# Patient Record
Sex: FEMALE | Race: ASIAN | Employment: OTHER | URBAN - METROPOLITAN AREA
[De-identification: names, ages, dates, MRNs, and addresses within clinical notes are randomized per-mention and may not be internally consistent; named-entity substitution may affect disease eponyms.]

---

## 2020-06-03 ENCOUNTER — HOSPITAL ENCOUNTER (EMERGENCY)
Facility: CLINIC | Age: 30
Discharge: HOME | End: 2020-06-03
Attending: EMERGENCY MEDICINE | Admitting: EMERGENCY MEDICINE
Payer: COMMERCIAL

## 2020-06-03 VITALS
OXYGEN SATURATION: 100 % | DIASTOLIC BLOOD PRESSURE: 64 MMHG | HEART RATE: 73 BPM | RESPIRATION RATE: 12 BRPM | TEMPERATURE: 97 F | SYSTOLIC BLOOD PRESSURE: 109 MMHG

## 2020-06-03 DIAGNOSIS — R55 VASOVAGAL SYNCOPE: Primary | ICD-10-CM

## 2020-06-03 LAB
ALBUMIN UR-MCNC: 10 MG/DL
ANION GAP SERPL CALCULATED.3IONS-SCNC: 6 MMOL/L (ref 3–14)
APPEARANCE UR: CLEAR
BACTERIA #/AREA URNS HPF: ABNORMAL /HPF
BASOPHILS # BLD AUTO: 0.1 10E9/L (ref 0–0.2)
BASOPHILS NFR BLD AUTO: 1.8 %
BILIRUB UR QL STRIP: NEGATIVE
BUN SERPL-MCNC: 10 MG/DL (ref 7–30)
CALCIUM SERPL-MCNC: 8.5 MG/DL (ref 8.5–10.1)
CHLORIDE SERPL-SCNC: 108 MMOL/L (ref 94–109)
CO2 SERPL-SCNC: 25 MMOL/L (ref 20–32)
COLOR UR AUTO: YELLOW
CREAT SERPL-MCNC: 0.65 MG/DL (ref 0.52–1.04)
DIFFERENTIAL METHOD BLD: NORMAL
EOSINOPHIL # BLD AUTO: 0.6 10E9/L (ref 0–0.7)
EOSINOPHIL NFR BLD AUTO: 7.7 %
ERYTHROCYTE [DISTWIDTH] IN BLOOD BY AUTOMATED COUNT: 11.9 % (ref 10–15)
GFR SERPL CREATININE-BSD FRML MDRD: >90 ML/MIN/{1.73_M2}
GLUCOSE SERPL-MCNC: 98 MG/DL (ref 70–99)
GLUCOSE UR STRIP-MCNC: NEGATIVE MG/DL
HCG SERPL QL: NEGATIVE
HCT VFR BLD AUTO: 40.7 % (ref 35–47)
HGB BLD-MCNC: 13.9 G/DL (ref 11.7–15.7)
HGB UR QL STRIP: NEGATIVE
HYALINE CASTS #/AREA URNS LPF: 1 /LPF (ref 0–2)
IMM GRANULOCYTES # BLD: 0 10E9/L (ref 0–0.4)
IMM GRANULOCYTES NFR BLD: 0.3 %
KETONES UR STRIP-MCNC: NEGATIVE MG/DL
LEUKOCYTE ESTERASE UR QL STRIP: NEGATIVE
LYMPHOCYTES # BLD AUTO: 1.8 10E9/L (ref 0.8–5.3)
LYMPHOCYTES NFR BLD AUTO: 23.8 %
MCH RBC QN AUTO: 30.4 PG (ref 26.5–33)
MCHC RBC AUTO-ENTMCNC: 34.2 G/DL (ref 31.5–36.5)
MCV RBC AUTO: 89 FL (ref 78–100)
MONOCYTES # BLD AUTO: 0.6 10E9/L (ref 0–1.3)
MONOCYTES NFR BLD AUTO: 7.4 %
MUCOUS THREADS #/AREA URNS LPF: PRESENT /LPF
NEUTROPHILS # BLD AUTO: 4.4 10E9/L (ref 1.6–8.3)
NEUTROPHILS NFR BLD AUTO: 59 %
NITRATE UR QL: NEGATIVE
NRBC # BLD AUTO: 0 10*3/UL
NRBC BLD AUTO-RTO: 0 /100
PH UR STRIP: 6 PH (ref 5–7)
PLATELET # BLD AUTO: 242 10E9/L (ref 150–450)
POTASSIUM SERPL-SCNC: 3.4 MMOL/L (ref 3.4–5.3)
RBC # BLD AUTO: 4.57 10E12/L (ref 3.8–5.2)
RBC #/AREA URNS AUTO: 1 /HPF (ref 0–2)
SODIUM SERPL-SCNC: 139 MMOL/L (ref 133–144)
SOURCE: ABNORMAL
SP GR UR STRIP: 1.02 (ref 1–1.03)
SQUAMOUS #/AREA URNS AUTO: 5 /HPF (ref 0–1)
UROBILINOGEN UR STRIP-MCNC: NORMAL MG/DL (ref 0–2)
WBC # BLD AUTO: 7.4 10E9/L (ref 4–11)
WBC #/AREA URNS AUTO: 2 /HPF (ref 0–5)

## 2020-06-03 PROCEDURE — 96361 HYDRATE IV INFUSION ADD-ON: CPT

## 2020-06-03 PROCEDURE — 84703 CHORIONIC GONADOTROPIN ASSAY: CPT | Performed by: EMERGENCY MEDICINE

## 2020-06-03 PROCEDURE — 25800030 ZZH RX IP 258 OP 636: Performed by: EMERGENCY MEDICINE

## 2020-06-03 PROCEDURE — 99284 EMERGENCY DEPT VISIT MOD MDM: CPT | Mod: 25

## 2020-06-03 PROCEDURE — 80048 BASIC METABOLIC PNL TOTAL CA: CPT | Performed by: EMERGENCY MEDICINE

## 2020-06-03 PROCEDURE — 81001 URINALYSIS AUTO W/SCOPE: CPT | Performed by: EMERGENCY MEDICINE

## 2020-06-03 PROCEDURE — 93005 ELECTROCARDIOGRAM TRACING: CPT

## 2020-06-03 PROCEDURE — 96360 HYDRATION IV INFUSION INIT: CPT

## 2020-06-03 PROCEDURE — 85025 COMPLETE CBC W/AUTO DIFF WBC: CPT | Performed by: EMERGENCY MEDICINE

## 2020-06-03 RX ORDER — SODIUM CHLORIDE 9 MG/ML
1000 INJECTION, SOLUTION INTRAVENOUS CONTINUOUS
Status: DISCONTINUED | OUTPATIENT
Start: 2020-06-03 | End: 2020-06-03 | Stop reason: HOSPADM

## 2020-06-03 RX ADMIN — SODIUM CHLORIDE 1000 ML: 9 INJECTION, SOLUTION INTRAVENOUS at 14:56

## 2020-06-03 ASSESSMENT — ENCOUNTER SYMPTOMS
VOMITING: 0
FEVER: 0
BACK PAIN: 0
ABDOMINAL PAIN: 0
SHORTNESS OF BREATH: 0
DIARRHEA: 0

## 2020-06-03 NOTE — ED PROVIDER NOTES
History     Chief Complaint:  Loss of Consciousness    The history is provided by the patient.      Sydni Smith is a 30 year old female who presents with a syncope episode while sitting down at the hair salon just prior to arrival. The hair salon called EMS, prompting her arrival to the ED. Patient reports various syncopal episodes in the past. They have been due to a variety of reasons, including dehydration, electrolyte abnormalities, and iron deficiency.     Here, the patient states she is back to feeling normal. She denies chest or abdominal pain, shortness of breath, diarrhea, vomiting, back pain, or fevers. Patient denies a family history of sudden cardiac deaths. To note, patient has had a history of anemia but states it has only been during pregnancies and denies being pregnant now.     Last menstrual period: 2020    Allergies:  No Known Drug Allergies    Medications:    The patient is not currently taking any prescribed medications.     Past Medical History:    The patient does not have any pertinent past medical history.    Past Surgical History:     Section    Family History:    No past pertinent family history.    Social History:  The patient was accompanied to the ED via EMS.  Smoking Status: Never Smoker  Smokeless Tobacco: Never Used  Alcohol Use: Positive  Drug Use: Negative    Review of Systems   Constitutional: Negative for fever.   Respiratory: Negative for shortness of breath.    Cardiovascular: Negative for chest pain.   Gastrointestinal: Negative for abdominal pain, diarrhea and vomiting.   Musculoskeletal: Negative for back pain.   Neurological: Positive for syncope.   All other systems reviewed and are negative.    Physical Exam     Patient Vitals for the past 24 hrs:   BP Temp Temp src Pulse Heart Rate Resp SpO2   20 1630 109/64 -- -- 73 70 12 100 %   20 1615 105/71 -- -- 71 75 20 100 %   20 1515 104/63 -- -- 65 63 15 100 %   20 1500 101/74 -- -- 70  62 21 98 %   06/03/20 1426 100/59 97  F (36.1  C) Oral 59 -- 18 100 %       1549 The patient's orthostatic vitals were monitored here in the emergency department and found to be 107/65 while laying with a heart rate of 68 bpm, 110/71 while sitting with a heart rate of 72 bpm, and 114/73 while standing with a heart rate of 98 bpm.    Physical Exam  General: Alert, appears well-developed and well-nourished. Cooperative.     In no acute distress  HEENT:  Head:  Atraumatic  Ears:  External ears are normal  Mouth/Throat:  Oropharynx is without erythema or exudate and mucous membranes are moist.   Eyes:   Conjunctivae normal and EOM are normal. No scleral icterus.    Pupils are equal, round, and reactive to light.   CV:  Normal rate, regular rhythm, normal heart sounds and radial pulses are 2+ and symmetric.  No murmur.  Resp:  Breath sounds are clear bilaterally    Non-labored, no retractions or accessory muscle use  GI:  Abdomen is soft, no distension, no tenderness. No rebound or guarding.  No CVA tenderness bilaterally  MS:  Normal range of motion. No edema.    Normal strength in all 4 extremities.     Back atraumatic.    No midline cervical, thoracic, or lumbar tenderness  Skin:  Warm and dry.  No rash or lesions noted.  Neuro: Alert. Normal strength.  GCS: 15  Psych:  Normal mood and affect.    Emergency Department Course     ECG:  Indication: Loss of consciousness  Time: 1430  Vent. Rate 59 bpm. UT interval 146. QRS duration 92. QT/QTc 484/479. P-R-T axis 44 77 66. Sinus bradycardia. Normal ECG. Read time: 1432.    Laboratory:  Laboratory findings were communicated with the patient who voiced understanding of the findings.    BMP: Glucose 98 o/w WNL (Creatinine: 0.65)    CBC: WBC: 7.4, HGB: 13.9, PLT: 242    HCG Qualitative: Negative    UA with Microscopic: Protein Albumin: 10 (A), Bacteria: Few, Squamous Epithelial: 5 (H), Mucous: Present o/w Negative    Interventions:  1456 NS 1L IV    Emergency Department  Course:  Past medical records, nursing notes, and vitals reviewed.    1425 I performed an exam of the patient as documented above.     EKG obtained in the ED, see results above.     IV was inserted and blood was drawn for laboratory testing, results above.    The patient provided a urine sample here in the emergency department. This was sent for laboratory testing, findings above.     1617 I rechecked the patient and discussed the results of her workup thus far.     Findings and plan explained to the Patient. Patient discharged home with instructions regarding supportive care, medications, and reasons to return. The importance of close follow-up was reviewed.    I personally reviewed the laboratory results with the Patient and answered all related questions prior to discharge.     Impression & Plan     Medical Decision Making:  Sydni Smith is a 30 year old female who presented to the ED today for evaluation of possible syncope.  Details of the patient's history can be noted in the HPI.  Differential diagnosis included cardiac arrhythmia, ACS, aortic stenosis, hypertrophic cardiomyopathy, PE, orthostatic hypotension, drug use/reaction, seizure, TIA, stroke, vasovagal, electrolyte abnormalities, amongst others.  Upon my exam, patient was well-appearing and neurovascularly intact.  No murmur heard.  Basic laboratory analysis was completed, results without significant abnormalities.  ECG showed no signs of arrhythmia.  Interventions here included IV fluids.  Orthostatics were completed, and were within normal limits. No other signs of chest pain, shortness of breath, no seizure-like activity or postictal period.  No family history of sudden death.  No focal neurologic symptoms and no complaints of concerning headache here today.  With reassuring workup and physical exam here in the ED, I felt comfortable with the patient's discharge and outpatient management.  Symptoms most likely consistent with vasovagal syncope.   Symptomatic care as discussed and the patient will follow up with her primary care provider within the next few days for recheck.  They will return to the ED for any changing worsening symptoms, chest pain, shortness of breath, severe headache, fever, additional syncope, new concerns.  All questions were answered prior to discharge.  The patient is in agreement with the treatment plan as stated above.    Diagnosis:    ICD-10-CM    1. Vasovagal syncope  R55 UA with Microscopic       Disposition:  Discharged to home.    Scribe Disclosure:  IMichel, am serving as a scribe at 2:57 PM on 6/3/2020 to document services personally performed by Elie Hess MD, based on my observations and the provider's statements to me.     I, Meliza Herrera, am serving as a scribe on 6/3/2020 at 5:02 PM to personally document services performed by Elie Hess MD, based on my observations and the provider's statements to me.        Elie Hess MD  06/03/20 0477

## 2020-06-03 NOTE — ED AVS SNAPSHOT
[facility] Emergency Department  [address]  [facility] [address]  Phone:  [phone]  Fax:  [phone]                                    Sydni Smith   MRN: [id number]    Department:  [facility] Emergency Department   Date of Visit:  6/3/2020           After Visit Summary Signature Page    I have received my discharge instructions, and my questions have been answered. I have discussed any challenges I see with this plan with the nurse or doctor.    ..........................................................................................................................................  Patient/Patient Representative Signature      ..........................................................................................................................................  Patient Representative Print Name and Relationship to Patient    ..................................................               ................................................  Date                                   Time    ..........................................................................................................................................  Reviewed by Signature/Title    ...................................................              ..............................................  Date                                               Time          22EPIC Rev 08/18

## 2020-06-03 NOTE — ED TRIAGE NOTES
Pt was at a hair saloon when she had a syncopal episode. Pt was diaphoretic, tachy, and hypotensive upon EMS arrival. Pt fatigued otherwise denies any other symptoms at this time.

## 2020-06-03 NOTE — ED NOTES
Bed: ED31  Expected date: 6/3/20  Expected time: 2:13 PM  Means of arrival: Ambulance  Comments:  BV1 30yF Syncopal episode

## 2020-06-04 LAB — INTERPRETATION ECG - MUSE: NORMAL
